# Patient Record
Sex: FEMALE | Race: WHITE | NOT HISPANIC OR LATINO | Employment: FULL TIME | URBAN - METROPOLITAN AREA
[De-identification: names, ages, dates, MRNs, and addresses within clinical notes are randomized per-mention and may not be internally consistent; named-entity substitution may affect disease eponyms.]

---

## 2017-02-22 ENCOUNTER — HOSPITAL ENCOUNTER (EMERGENCY)
Facility: MEDICAL CENTER | Age: 47
End: 2017-02-22
Attending: EMERGENCY MEDICINE
Payer: COMMERCIAL

## 2017-02-22 VITALS
OXYGEN SATURATION: 97 % | WEIGHT: 167.33 LBS | TEMPERATURE: 98.1 F | DIASTOLIC BLOOD PRESSURE: 76 MMHG | BODY MASS INDEX: 28.57 KG/M2 | SYSTOLIC BLOOD PRESSURE: 114 MMHG | RESPIRATION RATE: 16 BRPM | HEIGHT: 64 IN | HEART RATE: 91 BPM

## 2017-02-22 DIAGNOSIS — J04.0 LARYNGITIS: ICD-10-CM

## 2017-02-22 PROCEDURE — 700102 HCHG RX REV CODE 250 W/ 637 OVERRIDE(OP): Performed by: EMERGENCY MEDICINE

## 2017-02-22 PROCEDURE — 99284 EMERGENCY DEPT VISIT MOD MDM: CPT

## 2017-02-22 PROCEDURE — A9270 NON-COVERED ITEM OR SERVICE: HCPCS | Performed by: EMERGENCY MEDICINE

## 2017-02-22 RX ORDER — DEXAMETHASONE 4 MG/1
10 TABLET ORAL ONCE
Status: COMPLETED | OUTPATIENT
Start: 2017-02-22 | End: 2017-02-22

## 2017-02-22 RX ADMIN — DEXAMETHASONE 10 MG: 4 TABLET ORAL at 04:45

## 2017-02-22 NOTE — ED AVS SNAPSHOT
Home Care Instructions                                                                                                                Alli Mccarthy   MRN: [id number]    Department:  [facility], Emergency Dept   Date of Visit:  2/22/2017            [facility], Emergency Dept    [address] [place] [id number]    Phone:  [phone]      You were seen by     Ismael Taylor M.D.      Your Diagnosis Was     Laryngitis     J04.0       These are the medications you received during your hospitalization from 02/22/2017 0324 to 02/22/2017 0447     Date/Time Order Dose Route Action    02/22/2017 0445 dexamethasone (DECADRON) tablet 10 mg 10 mg Oral Given      Follow-up Information     1. Schedule an appointment as soon as possible for a visit with [facility], Emergency Dept.    Specialty:  Emergency Medicine    Contact information    [address] [place] [id number] [phone]        2. Schedule an appointment as soon as possible for a visit with [facility].    Contact information    [address] [place] [address] [phone]      Medication Information     Review all of your home medications and newly ordered medications with your primary doctor and/or pharmacist as soon as possible. Follow medication instructions as directed by your doctor and/or pharmacist.     Please keep your complete medication list with you and share with your physician. Update the information when medications are discontinued, doses are changed, or new medications (including over-the-counter products) are added; and carry medication information at all times in the event of emergency situations.               Medication List      ASK your doctor about these medications        Instructions    asa/apap/caffeine 250-250-65 MG Tabs   Commonly known as:  EXCEDRIN    Take 1 Tab by mouth every 6 hours as needed for Headache.   Dose:  1 Tab       LO LOESTRIN FE  PO    Take  by mouth.       SUMAtriptan 25 MG Tabs tablet   Commonly known as:  IMITREX    Take 1-4 Tabs by mouth Once PRN for Migraine for up to 1 dose.   Dose:   mg                 Discharge Instructions       Laryngitis  Laryngitis is inflammation of your vocal cords. This causes hoarseness, coughing, loss of voice, sore throat, or a dry throat. Your vocal cords are two bands of muscles that are found in your throat. When you speak, these cords come together and vibrate. These vibrations come out through your mouth as sound. When your vocal cords are inflamed, your voice sounds different.  Laryngitis can be temporary (acute) or long-term (chronic). Most cases of acute laryngitis improve with time. Chronic laryngitis is laryngitis that lasts for more than three weeks.  CAUSES  Acute laryngitis may be caused by:  · A viral infection.  · Lots of talking, yelling, or singing. This is also called vocal strain.  · Bacterial infections.  Chronic laryngitis may be caused by:  · Vocal strain.  · Injury to your vocal cords.  · Acid reflux (gastroesophageal reflux disease or GERD).  · Allergies.  · Sinus infection.  · Smoking.  · Alcohol abuse.  · Breathing in chemicals or dust.  · Growths on the vocal cords.  RISK FACTORS  Risk factors for laryngitis include:  · Smoking.  · Alcohol abuse.  · Having allergies.  SIGNS AND SYMPTOMS  Symptoms of laryngitis may include:  · Low, hoarse voice.  · Loss of voice.  · Dry cough.  · Sore throat.  · Stuffy nose.  DIAGNOSIS  Laryngitis may be diagnosed by:  · Physical exam.  · Throat culture.  · Blood test.  · Laryngoscopy. This procedure allows your health care provider to look at your vocal cords with a mirror or viewing tube.  TREATMENT  Treatment for laryngitis depends on what is causing it. Usually, treatment involves resting your voice and using medicines to soothe your throat. However, if your laryngitis is caused by a bacterial infection, you may need to take antibiotic  medicine. If your laryngitis is caused by a growth, you may need to have a procedure to remove it.  HOME CARE INSTRUCTIONS  · Drink enough fluid to keep your urine clear or pale yellow.  · Breathe in moist air. Use a humidifier if you live in a dry climate.  · Take medicines only as directed by your health care provider.  · If you were prescribed an antibiotic medicine, finish it all even if you start to feel better.  · Do not smoke cigarettes or electronic cigarettes. If you need help quitting, ask your health care provider.  · Talk as little as possible. Also avoid whispering, which can cause vocal strain.  · Write instead of talking. Do this until your voice is back to normal.  SEEK MEDICAL CARE IF:  · You have a fever.  · You have increasing pain.  · You have difficulty swallowing.  SEEK IMMEDIATE MEDICAL CARE IF:  · You cough up blood.  · You have trouble breathing.     This information is not intended to replace advice given to you by your health care provider. Make sure you discuss any questions you have with your health care provider.     Document Released: 12/18/2006 Document Revised: 01/08/2016 Document Reviewed: 06/02/2015  [employer] Interactive Patient Education ©2016 [employer]            Patient Information     Patient Information    Following emergency treatment: all patient requiring follow-up care must return either to a private physician or a clinic if your condition worsens before you are able to obtain further medical attention, please return to the emergency room.     Billing Information    At [facility], we work to make the billing process streamlined for our patients.  Our Representatives are here to answer any questions you may have regarding your hospital bill.  If you have insurance coverage and have supplied your insurance information to us, we will submit a claim to your insurer on your behalf.  Should you have any questions regarding your bill, we can be reached online or by phone  as follows:  Online: You are able pay your bills online or live chat with our representatives about any billing questions you may have. We are here to help Monday - Friday from 8:00am to 7:30pm and 9:00am - 12:00pm on Saturdays.  Please visit [email or web address]  for more information.   Phone:  [phone] or [phone]    Please note that your emergency physician, surgeon, pathologist, radiologist, anesthesiologist, and other specialists are not employed by [facility] and will therefore bill separately for their services.  Please contact them directly for any questions concerning their bills at the numbers below:     Emergency Physician Services:  [phone]  [facility]:  [phone]  [facility]:  [phone]  [facility]:  [phone]    1. Your final bill may vary from the amount quoted upon discharge if all procedures are not complete at that time, or if your doctor has additional procedures of which we are not aware. You will receive an additional bill if you return to the Emergency Department at [facility] for suture removal regardless of the facility of which the sutures were placed.     2. Please arrange for settlement of this account at the emergency registration.    3. All self-pay accounts are due in full at the time of treatment.  If you are unable to meet this obligation then payment is expected within 4-5 days.     4. If you have had radiology studies (CT, X-ray, Ultrasound, MRI), you have received a preliminary result during your emergency department visit. Please contact the radiology department [phone] to receive a copy of your final result. Please discuss the Final result with your primary physician or with the follow up physician provided.     Crisis Hotline:  [place] Crisis Hotline:  3-422-NQSKEZQ or 1-658.430.1983  [place] Crisis Hotline:    1-380.911.6354 or 018-039-0194         ED Discharge  Follow Up Questions    1. In order to provide you with very good care, we would like to follow up with a phone call in the next few days.  May we have your permission to contact you?     YES /  NO    2. What is the best phone number to call you? (       )_____-__________    3. What is the best time to call you?      Morning  /  Afternoon  /  Evening                   Patient Signature:  ____________________________________________________________    Date:  ____________________________________________________________

## 2017-02-22 NOTE — ED NOTES
Pt given discharge and follow up instructions and no prescriptions, all questions answered, pt verbalized understanding. Copy of discharge provided to pt. Signed copy in chart.  Pt states that all personal belongings are in possession.

## 2017-02-22 NOTE — ED AVS SNAPSHOT
Red-rabbit Access Code: [email or web address]  Expires: 3/14/2017 10:16 AM    Your email address is not on file at [facility].  Email Addresses are required for you to sign up for Red-rabbit, please contact [phone] to verify your personal information and to provide your email address prior to attempting to register for Red-rabbit.    Alli Mccarthy  [address] [place] [address]    Red-rabbit  A secure, online tool to manage your health information     [employer]’s Red-rabbit® is a secure, online tool that connects you to your personalized health information from the privacy of your home -- day or night - making it very easy for you to manage your healthcare. Once the activation process is completed, you can even access your medical information using the Red-rabbit araceli, which is available for free in the Apple Araceli store or Google Play store.     To learn more about Red-rabbit, visit [email or web address]    There are two levels of access available (as shown below):   My Chart Features  [facility] Primary Care Doctor [facility]  Specialists [facility]  Urgent  Care Non-[facility] Primary Care Doctor   Email your healthcare team securely and privately 24/7 X X X    Manage appointments: schedule your next appointment; view details of past/upcoming appointments X      Request prescription refills. X      View recent personal medical records, including lab and immunizations X X X X   View health record, including health history, allergies, medications X X X X   Read reports about your outpatient visits, procedures, consult and ER notes X X X X   See your discharge summary, which is a recap of your hospital and/or ER visit that includes your diagnosis, lab results, and care plan X X  X     How to register for Clarityt:  Once your e-mail address has been verified, follow the following steps to sign up for Clarityt.     1. Go to  [email or web address]  2. Click on the Sign Up Now box, which takes you to the New Member Sign Up page. You  will need to provide the following information:  a. Enter your SuperTruper Access Code exactly as it appears at the top of this page. (You will not need to use this code after you’ve completed the sign-up process. If you do not sign up before the expiration date, you must request a new code.)   b. Enter your date of birth.   c. Enter your home email address.   d. Click Submit, and follow the next screen’s instructions.  3. Create a iOpenert ID. This will be your SuperTruper login ID and cannot be changed, so think of one that is secure and easy to remember.  4. Create a SuperTruper password. You can change your password at any time.  5. Enter your Password Reset Question and Answer. This can be used at a later time if you forget your password.   6. Enter your e-mail address. This allows you to receive e-mail notifications when new information is available in SuperTruper.  7. Click Sign Up. You can now view your health information.    For assistance activating your SuperTruper account, call [phone]

## 2017-02-22 NOTE — ED PROVIDER NOTES
"ED Provider Note    Scribed for Ismael Taylor M.D. by Darshana Hooker. 2/22/2017  4:22 AM    Primary care provider: Pcp Pt States None  Means of arrival: Walk-in  History obtained from: Patient  History limited by: None    CHIEF COMPLAINT  Chief Complaint   Patient presents with   • Laryngitis     x 1 day. painful cough   • Anxiety     Patient states she had a [panic attack and felt SOB     HPI  Alli Mccarthy is a 46 y.o. female who presents to the Emergency Department for cough and voice loss onset yesterday. Per patient, \"it doesn't feel right when she coughs\". She denies any associated pain or sore throat. Patient also states she woke up this morning with a \"panic attack\" and felt as if she could not breathe, prompting her visit to the ED. Patient states she is not currently experiencing shortness of breath and it has resolved. She also denies recent fever and rhinorrhea.     REVIEW OF SYSTEMS  Pertinent positives include cough, voice loss, shortness of breath .   Pertinent negatives include no fever, rhinorrhea, sore throat.    All other systems reviewed and negative.      PAST MEDICAL HISTORY   No chronic illnesses reported.     SURGICAL HISTORY  patient denies any surgical history    SOCIAL HISTORY  She is from from [place].     FAMILY HISTORY  None noted during this encounter.     CURRENT MEDICATIONS    Current Outpatient Prescriptions on File Prior to Encounter   Medication Sig Dispense Refill   • sumatriptan (IMITREX) 25 MG Tab Take 1-4 Tabs by mouth Once PRN for Migraine for up to 1 dose. 10 Tab 0   • Norethin-Eth Estrad-Fe Biphas (LO LOESTRIN FE PO) Take  by mouth.     • asa/apap/caffeine (EXCEDRIN) 250-250-65 MG Tab Take 1 Tab by mouth every 6 hours as needed for Headache.          ALLERGIES  No Known Allergies    PHYSICAL EXAM  VITAL SIGNS: /78 mmHg  Pulse 105  Temp(Src) 36.7 °C (98.1 °F)  Resp 14  Ht 1.626 m (5' 4\")  Wt 75.9 kg (167 lb 5.3 oz)  BMI 28.71 kg/m2  SpO2 97%    Nursing note and " vitals reviewed.  Constitutional: Well-developed and well-nourished. No distress. Raspy voice.   HENT: Head is normocephalic and atraumatic. Patent airway. Oropharynx is clear and moist without exudate or erythema. No tonsillar swelling. No peritonsillar abscess.  Eyes: Pupils are equal, round, and reactive to light. Conjunctiva are normal.   Cardiovascular: Normal rate and regular rhythm. No murmur heard. Normal radial pulses.  Pulmonary/Chest: No stridor. Breath sounds normal. No wheezes or rales.   Abdominal: Soft and non-tender. No distention    Musculoskeletal: Extremities exhibit normal range of motion without edema or tenderness.   Neurological: Awake, alert and oriented to person, place, and time. No focal deficits noted.  Skin: Skin is warm and dry. No rash.   Psychiatric: Normal mood and affect. Appropriate for clinical situation    DIAGNOSTIC STUDIES / PROCEDURES    COURSE & MEDICAL DECISION MAKING  Nursing notes, VS, PMSFHx reviewed in chart.     Review of past medical records shows the patient has no prior ER visits.      4:22 AM - Patient seen and examined at bedside. I informed the patient I suspect her symptoms are secondary to viral laryngitis. There is no evidence of bacterial infection requiring antibiotic intervention at this time. She is afebrile and her lungs are clear upon auscultation. She also has an oxygen saturation of 97% on room air. I explained the steroid I would like to administer here in the ED with long-lasting effects and the patient was agreeable. Patient will be treated with 10 mg decadron.     The patient was discharged home with an information sheet on viral laryngitis and told to return immediately for any signs or symptoms listed.  The patient agreed to the discharge precautions and follow-up plan which is documented in [facility].    The patient will return for new or worsening symptoms and is stable at the time of discharge.    The patient is referred to a primary physician for  blood pressure management, diabetic screening, and for all other preventative health concerns.    DISPOSITION:  Patient will be discharged home in stable condition.    FOLLOW UP:  [facility], Emergency Dept  [address] [address] [phone]  Schedule an appointment as soon as possible for a visit      [address] [address]  [phone]  Schedule an appointment as soon as possible for a visit      FINAL IMPRESSION  1. Laryngitis          Darshana BAKER (Scribe), am scribing for, and in the presence of, Ismael Taylor M.D..    Electronically signed by: Darshana Hooker (Apibe), 2/22/2017    Ismael BAKER M.D. personally performed the services described in this documentation, as scribed by Darshana Hooker in my presence, and it is both accurate and complete.    The note accurately reflects work and decisions made by me.  Ismael Taylor  2/22/2017  11:30 AM

## 2017-02-22 NOTE — ED NOTES
Chief Complaint   Patient presents with   • Laryngitis     x 1 day. painful cough   • Anxiety     Patient states she had a [panic attack and felt SOB   Pt ambulatory to triage with above complaint. Pt returned to lobby, educated on triage process, and to inform staff of any changes or concerns.

## 2017-02-22 NOTE — DISCHARGE INSTRUCTIONS
Laryngitis  Laryngitis is inflammation of your vocal cords. This causes hoarseness, coughing, loss of voice, sore throat, or a dry throat. Your vocal cords are two bands of muscles that are found in your throat. When you speak, these cords come together and vibrate. These vibrations come out through your mouth as sound. When your vocal cords are inflamed, your voice sounds different.  Laryngitis can be temporary (acute) or long-term (chronic). Most cases of acute laryngitis improve with time. Chronic laryngitis is laryngitis that lasts for more than three weeks.  CAUSES  Acute laryngitis may be caused by:  · A viral infection.  · Lots of talking, yelling, or singing. This is also called vocal strain.  · Bacterial infections.  Chronic laryngitis may be caused by:  · Vocal strain.  · Injury to your vocal cords.  · Acid reflux (gastroesophageal reflux disease or GERD).  · Allergies.  · Sinus infection.  · Smoking.  · Alcohol abuse.  · Breathing in chemicals or dust.  · Growths on the vocal cords.  RISK FACTORS  Risk factors for laryngitis include:  · Smoking.  · Alcohol abuse.  · Having allergies.  SIGNS AND SYMPTOMS  Symptoms of laryngitis may include:  · Low, hoarse voice.  · Loss of voice.  · Dry cough.  · Sore throat.  · Stuffy nose.  DIAGNOSIS  Laryngitis may be diagnosed by:  · Physical exam.  · Throat culture.  · Blood test.  · Laryngoscopy. This procedure allows your health care provider to look at your vocal cords with a mirror or viewing tube.  TREATMENT  Treatment for laryngitis depends on what is causing it. Usually, treatment involves resting your voice and using medicines to soothe your throat. However, if your laryngitis is caused by a bacterial infection, you may need to take antibiotic medicine. If your laryngitis is caused by a growth, you may need to have a procedure to remove it.  HOME CARE INSTRUCTIONS  · Drink enough fluid to keep your urine clear or pale yellow.  · Breathe in moist air. Use a  humidifier if you live in a dry climate.  · Take medicines only as directed by your health care provider.  · If you were prescribed an antibiotic medicine, finish it all even if you start to feel better.  · Do not smoke cigarettes or electronic cigarettes. If you need help quitting, ask your health care provider.  · Talk as little as possible. Also avoid whispering, which can cause vocal strain.  · Write instead of talking. Do this until your voice is back to normal.  SEEK MEDICAL CARE IF:  · You have a fever.  · You have increasing pain.  · You have difficulty swallowing.  SEEK IMMEDIATE MEDICAL CARE IF:  · You cough up blood.  · You have trouble breathing.     This information is not intended to replace advice given to you by your health care provider. Make sure you discuss any questions you have with your health care provider.     Document Released: 12/18/2006 Document Revised: 01/08/2016 Document Reviewed: 06/02/2015  [employer] Interactive Patient Education ©2016 [employer]

## 2017-02-22 NOTE — ED AVS SNAPSHOT
2/22/2017          Alli Mccarthy  [address]  [place] [address]    Dear Alli:    [facility] wants to ensure your discharge home is safe and you or your loved ones have had all your questions answered regarding your care after you leave the hospital.    You may receive a telephone call within two days of your discharge.  This call is to make certain you understand your discharge instructions as well as ensure we provided you with the best care possible during your stay with us.     The call will only last approximately 3-5 minutes and will be done by a nurse.    Once again, we want to ensure your discharge home is safe and that you have a clear understanding of any next steps in your care.  If you have any questions or concerns, please do not hesitate to contact us, we are here for you.  Thank you for choosing [facility] for your healthcare needs.    Sincerely,    Amanuel Clements    [facility]

## 2017-03-09 ENCOUNTER — OFFICE VISIT (OUTPATIENT)
Dept: URGENT CARE | Facility: PHYSICIAN GROUP | Age: 47
End: 2017-03-09
Payer: COMMERCIAL

## 2017-03-09 VITALS
HEART RATE: 112 BPM | RESPIRATION RATE: 14 BRPM | SYSTOLIC BLOOD PRESSURE: 124 MMHG | BODY MASS INDEX: 28.17 KG/M2 | HEIGHT: 64 IN | OXYGEN SATURATION: 98 % | WEIGHT: 165 LBS | DIASTOLIC BLOOD PRESSURE: 86 MMHG | TEMPERATURE: 98 F

## 2017-03-09 DIAGNOSIS — J01.90 ACUTE BACTERIAL SINUSITIS: ICD-10-CM

## 2017-03-09 DIAGNOSIS — R55 SYNCOPE AND COLLAPSE: ICD-10-CM

## 2017-03-09 DIAGNOSIS — B96.89 ACUTE BACTERIAL SINUSITIS: ICD-10-CM

## 2017-03-09 PROCEDURE — 99214 OFFICE O/P EST MOD 30 MIN: CPT | Performed by: FAMILY MEDICINE

## 2017-03-09 RX ORDER — AZITHROMYCIN 250 MG/1
TABLET, FILM COATED ORAL
Qty: 6 TAB | Refills: 0 | Status: SHIPPED | OUTPATIENT
Start: 2017-03-09

## 2017-03-09 NOTE — PROGRESS NOTES
Chief Complaint:    Chief Complaint   Patient presents with   • Sinus Problem     pressure and pain x 2 days Pt states she also passed out        History of Present Illness:    Seen in ER on 2/22/17. Says cough has never gone away. But most recently, having pain in sinus regions, teeth hurt, nasal symptoms, and purulent mucus from nose. Feels like sinus infection. Reports has been rx'd Z-dora in past for sinus infection few years ago and it worked/was tolerated. Using OTC meds for symptoms. She reports last night she passed out for no apparent reason. Woke up on ground. Does not think was out too long. Does not typically pass out, although she was seen for some dizziness on 6/16/16.      Review of Systems:    Constitutional: Negative for fever, chills, and diaphoresis.   Eyes: Negative for change in vision, photophobia, pain, redness, and discharge.  ENT: See HPI.   Respiratory: See HPI.     Cardiovascular: Negative for chest pain, palpitations, orthopnea, claudication, leg swelling, and PND.   Gastrointestinal: Negative for abdominal pain, nausea, vomiting, diarrhea, constipation, blood in stool, and melena.   Genitourinary: Negative for dysuria, urinary urgency, urinary frequency, hematuria, and flank pain.   Musculoskeletal: Negative for myalgias, joint pain, neck pain, and back pain.   Skin: Negative for rash and itching.   Neurological: See HPI.   Endo: Negative for polydipsia.   Heme: Does not bruise/bleed easily.   Psychiatric/Behavioral: Negative for depression, suicidal ideas, hallucinations, memory loss and substance abuse. The patient is not nervous/anxious and does not have insomnia.      Past Medical History:    History reviewed. No pertinent past medical history.    Past Surgical History:    History reviewed. No pertinent past surgical history.    Social History:    Social History     Social History   • Marital Status: Single     Spouse Name: N/A   • Number of Children: N/A   • Years of Education: N/A  "    Occupational History   • Not on file.     Social History Main Topics   • Smoking status: Never Smoker    • Smokeless tobacco: Not on file   • Alcohol Use: Not on file   • Drug Use: Not on file   • Sexual Activity: Not on file     Other Topics Concern   • Not on file     Social History Narrative       Family History:    History reviewed. No pertinent family history.    Medications:    Current Outpatient Prescriptions on File Prior to Visit   Medication Sig Dispense Refill   • sumatriptan (IMITREX) 25 MG Tab Take 1-4 Tabs by mouth Once PRN for Migraine for up to 1 dose. 10 Tab 0   • Norethin-Eth Estrad-Fe Biphas (LO LOESTRIN FE PO) Take  by mouth.     • asa/apap/caffeine (EXCEDRIN) 250-250-65 MG Tab Take 1 Tab by mouth every 6 hours as needed for Headache.       No current facility-administered medications on file prior to visit.       Allergies:    No Known Allergies      Vitals:    Filed Vitals:    03/09/17 1303   BP: 124/86   Pulse: 112   Temp: 36.7 °C (98 °F)   Resp: 14   Height: 1.626 m (5' 4\")   Weight: 74.844 kg (165 lb)   SpO2: 98%       Physical Exam:    Constitutional: Vital signs reviewed. Appears well-developed and well-nourished. Occl cough. No acute distress.   Eyes: Sclera white, conjunctivae clear.   ENT: Bilateral nasal congestion and erythematous nasal mucosa. External ears normal. External auditory canals normal without discharge. TMs translucent and non-bulging. Hearing normal. Lips/teeth are normal. Oral mucosa pink and moist. Posterior pharynx: WNL.  Neck: Neck supple.   Cardiovascular: Regular rate and rhythm. No murmur.  Pulmonary/Chest: Respirations non-labored. Clear to auscultation bilaterally.  Lymph: Cervical nodes without tenderness or enlargement.  Musculoskeletal: Normal gait. Normal range of motion. No muscular atrophy or weakness.  Neurological: Alert and oriented to person, place, and time. CN 2-12 intact. Muscle tone normal. Coordination normal. Normal cerebellar exam.  Skin: No " rashes or lesions. Warm, dry, normal turgor.  Psychiatric: Normal mood and affect. Behavior is normal. Judgment and thought content normal.       Assessment / Plan:    1. Acute bacterial sinusitis  - azithromycin (ZITHROMAX) 250 MG Tab; 2 TABS ON DAY 1, 1 TAB ON DAYS 2-5.  Dispense: 6 Tab; Refill: 0    2. Syncope and collapse      Discussed with her DDX and management options.    Declines Rx cough med. May continue with OTC meds for symptoms prn.    Agreeable to medication prescribed.    Regarding syncope last night, exam is benign today. Discussed doing EKG and blood tests to rule out conditions (as was done OV 6/16/16 when seen for dizziness).    She declines today, prefers to further observe and return if happens again.    Follow-up with PCP or urgent care if getting worse or not better with above.